# Patient Record
Sex: FEMALE | Race: WHITE | NOT HISPANIC OR LATINO | URBAN - METROPOLITAN AREA
[De-identification: names, ages, dates, MRNs, and addresses within clinical notes are randomized per-mention and may not be internally consistent; named-entity substitution may affect disease eponyms.]

---

## 2017-11-27 ENCOUNTER — TRANSCRIBE ORDERS (OUTPATIENT)
Dept: PHYSICAL THERAPY | Facility: HOSPITAL | Age: 62
End: 2017-11-27

## 2017-11-27 DIAGNOSIS — S81.802D WOUND OF LEFT LOWER EXTREMITY, SUBSEQUENT ENCOUNTER: Primary | ICD-10-CM

## 2017-11-27 DIAGNOSIS — S41.031D: ICD-10-CM

## 2017-11-30 ENCOUNTER — HOSPITAL ENCOUNTER (OUTPATIENT)
Dept: PHYSICAL THERAPY | Facility: HOSPITAL | Age: 62
Setting detail: THERAPIES SERIES
Discharge: HOME | End: 2017-11-30

## 2017-11-30 DIAGNOSIS — S41.001D OPEN WOUND OF RIGHT SHOULDER, SUBSEQUENT ENCOUNTER: Primary | ICD-10-CM

## 2017-11-30 PROCEDURE — 97162 PT EVAL MOD COMPLEX 30 MIN: CPT

## 2017-11-30 PROCEDURE — 97597 DBRDMT OPN WND 1ST 20 CM/<: CPT

## 2017-12-04 ENCOUNTER — HOSPITAL ENCOUNTER (OUTPATIENT)
Dept: PHYSICAL THERAPY | Facility: HOSPITAL | Age: 62
Setting detail: THERAPIES SERIES
Discharge: HOME | End: 2017-12-04

## 2017-12-04 DIAGNOSIS — S41.001D OPEN WOUND OF RIGHT SHOULDER, SUBSEQUENT ENCOUNTER: Primary | ICD-10-CM

## 2017-12-04 PROCEDURE — 97597 DBRDMT OPN WND 1ST 20 CM/<: CPT

## 2017-12-04 NOTE — THERAPY WOUND CARE TREATMENT
Outpatient Rehabilitation - Wound/Debridement Treatment Note  [facility]     Patient Name: Nunu Cruz  : 1955  MRN: [id number]  Today's Date: 2017                Admit Date: 2017    Visit Dx:    ICD-10-CM ICD-9-CM   1. Open wound of right shoulder, subsequent encounter S41.001D V58.89     880.00       There is no problem list on file for this patient.       Past Medical History:   Diagnosis Date   • Cancer     skin        Past Surgical History:   Procedure Laterality Date   • SKIN BIOPSY     • SKIN CANCER EXCISION           EVALUATION        PT Ortho       17 0800    Subjective Comments    Subjective Comments Pt reports she and her dtr can both already see progress with the current POC, more than they had with their previous POC. Pt is very pleased and does not think she needs a skin graft.  -MC    Subjective Pain    Able to rate subjective pain? yes  -MC    Pre-Treatment Pain Level 0  -MC    Post-Treatment Pain Level 0  -MC    Transfers    Transfers, Sit-Stand [place] independent  -MC    Transfers, Stand-Sit [place] independent  -MC    Transfer, Comment seated on EOB for tx  -MC    Gait Assessment/Treatment    Gait, [place] Level independent  -MC      User Key  (r) = Recorded By, (t) = Taken By, (c) = Cosigned By    Initials Name Provider Type     Brianna Rivas, PT Physical Therapist                    [facility] Wound       17 08          Wound 17 0845 Right shoulder other (see comments)    Wound - Properties Group Date first assessed: 17  - Time first assessed: 0845  - Side: Right  - Location: shoulder  - Type: other (see comments)  - Additional Comments: open wound s/p skin cancer excision  -    Wound WDL WDL  -MC      Dressing Appearance moist drainage;intact;dried drainage  -      Base debrided;clean;moist;pink;reddened;epithelialization   some evidence of epithelialization at the wound edges  -      Periwound Area  "intact;pink;dry  -MC      Edges open  -      Drainage Characteristics/Odor serosanguineous;no odor  -MC      Drainage Amount small  -MC      Wound Cleaning cleansed with;other (see comments)   phase one  -      Wound Interventions debrided  -      Dressing Dressing applied;silver impregnated dressing;low-adherent;foam   mepilex Ag foam, 6\" optifoam gentle  -MC      Periwound Care cleansed with pH balanced cleanser;dry periwound area maintained  -        User Key  (r) = Recorded By, (t) = Taken By, (c) = Cosigned By    Initials Name Provider Type    JACKIE Rivas, PT Physical Therapist            WOUND DEBRIDEMENT  Debridement Site 1  Location- Site 1: R shoulder  Selective Debridement- Site 1: Wound Surface <20cmsq  Instruments- Site 1: tweezers  Excised Tissue Description- Site 1: minimum, other (comment) (hypertrophic periwound crust)  Bleeding- Site 1: seeping, held pressure, 1 minute                   Therapy Education       12/04/17 0800          Therapy Education    Education Details Continue with current POC. Will drop down to once/week after Thursday appt if all is progressing well.  -MC      Given Symptoms/condition management;Bandaging/dressing change  -MC      Program Reinforced  -MC      How Provided Verbal;Demonstration  -MC      Provided to Patient  -MC      Level of Understanding Verbalized  -        User Key  (r) = Recorded By, (t) = Taken By, (c) = Cosigned By    Initials Name Provider Type    JACKIE Rivas PT Physical Therapist          Recommendation and Plan        PT Assessment/Plan       12/04/17 0800       PT Assessment    Functional Limitations Limitations in functional capacity and performance;Performance in self-care ADL;Other (comment)   wound mgmt  -     Impairments Integumentary integrity  -MC     Assessment Comments Pt demonstrates epithelialization at wound edges and minimal hypertrophic crust to be debrided today. The remainder of the wound bed is clean and " moist/red. Pt appears to be progressing well with the current POC. Pt follows up Thursday, will likely be appropriate to drop down to once/week if all is progressing well at this point.  -     Rehab Potential Good  -     Patient/caregiver participated in establishment of treatment plan and goals Yes  -     Patient would benefit from skilled therapy intervention Yes  -     PT Plan    PT Frequency 1x/week;2x/week  -     Physical Therapy Interventions (Optional Details) patient/family education;wound care  -     PT Plan Comments debridement prn, dressings management. No MIST.  -       User Key  (r) = Recorded By, (t) = Taken By, (c) = Cosigned By    Initials Name Provider Type     Brianna Rivas, PT Physical Therapist          Goals        PT OP Goals       12/04/17 0800       Time Calculation    PT Goal Re-Cert Due Date 02/28/18  -       User Key  (r) = Recorded By, (t) = Taken By, (c) = Cosigned By    Initials Name Provider Type     Brianna Rivas, PT Physical Therapist          PT Goal Re-Cert Due Date: 02/28/18            Time Calculation: Start Time: 0800    Therapy Charges for Today     Code Description Service Date Service Provider Modifiers Qty    29177967663 HC DAMIR DEBRIDE OPEN WOUND UP TO 20CM 12/4/2017 Brianna Rivas, PT GP 1                Brianna Rivas PT  12/4/2017

## 2017-12-07 ENCOUNTER — HOSPITAL ENCOUNTER (OUTPATIENT)
Dept: PHYSICAL THERAPY | Facility: HOSPITAL | Age: 62
Setting detail: THERAPIES SERIES
Discharge: HOME | End: 2017-12-07

## 2017-12-07 DIAGNOSIS — S41.001D OPEN WOUND OF RIGHT SHOULDER, SUBSEQUENT ENCOUNTER: Primary | ICD-10-CM

## 2017-12-07 PROCEDURE — 97597 DBRDMT OPN WND 1ST 20 CM/<: CPT

## 2017-12-07 NOTE — THERAPY WOUND CARE TREATMENT
Outpatient Rehabilitation - Wound/Debridement Treatment Note  [facility]     Patient Name: Nunu Cruz  : 1955  MRN: [id number]  Today's Date: 2017                Admit Date: 2017    Visit Dx:    ICD-10-CM ICD-9-CM   1. Open wound of right shoulder, subsequent encounter S41.001D V58.89     880.00       There is no problem list on file for this patient.       Past Medical History:   Diagnosis Date   • Cancer     skin        Past Surgical History:   Procedure Laterality Date   • SKIN BIOPSY     • SKIN CANCER EXCISION           EVALUATION        PT Ortho       17 09    Subjective Comments    Subjective Comments No complaints. Dtr did not change the dressing last night since she was coming here this morning.   -MC    Subjective Pain    Able to rate subjective pain? yes  -MC    Pre-Treatment Pain Level 0  -MC    Post-Treatment Pain Level 0  -MC    Transfers    Transfers, Sit-Stand [place] independent  -MC    Transfers, Stand-Sit [place] independent  -MC    Transfer, Comment seated on EOB for tx  -MC    Gait Assessment/Treatment    Gait, [place] Level independent  -MC      User Key  (r) = Recorded By, (t) = Taken By, (c) = Cosigned By    Initials Name Provider Type     Brianna Rivas, PT Physical Therapist                    [facility] Wound       17          Wound 17 0845 Right shoulder other (see comments)    Wound - Properties Group Date first assessed: 17  - Time first assessed: 845  - Side: Right  -MC Location: shoulder  - Type: other (see comments)  - Additional Comments: open wound s/p skin cancer excision  -MC    Wound WDL WDL  -MC      Dressing Appearance moist drainage;intact;dried drainage  -      Base debrided;clean;moist;pink;reddened;epithelialization   epithelialization at wound edges  -      Periwound Area intact;pink;dry  -MC      Edges open  -MC      Length (cm) 4.7  -MC      Width (cm) 7.1  -MC      Depth (cm) 0.1  -MC    "   Drainage Characteristics/Odor serosanguineous;no odor  -      Drainage Amount small  -MC      Picture taken yes  -MC      Wound Cleaning cleansed with;other (see comments)   phase one  -      Wound Interventions debrided  -      Dressing Dressing applied;silver impregnated dressing;low-adherent;foam   mepilex Ag foam, 6\" optifoam gentle border  -      Periwound Care cleansed with pH balanced cleanser;dry periwound area maintained  -        User Key  (r) = Recorded By, (t) = Taken By, (c) = Cosigned By    Initials Name Provider Type    JACKIE Rivas, PT Physical Therapist            WOUND DEBRIDEMENT  Debridement Site 1  Location- Site 1: R shoulder  Selective Debridement- Site 1: Wound Surface <20cmsq  Instruments- Site 1: tweezers  Excised Tissue Description- Site 1: minimum, other (comment) (periwound crust over new, intact skin)  Bleeding- Site 1: scant, held pressure, 1 minute                   Therapy Education       12/07/17 0930          Therapy Education    Education Details Continue with current POC - treatment once/week with dtr changing dressing every 2-3 days between appointments.  -MC      Given Symptoms/condition management;Bandaging/dressing change  -MC      Program Reinforced  -MC      How Provided Verbal;Demonstration  -MC      Provided to Patient  -MC      Level of Understanding Verbalized  -        User Key  (r) = Recorded By, (t) = Taken By, (c) = Cosigned By    Initials Name Provider Type    JACKIE Rivas, PT Physical Therapist          Recommendation and Plan        PT Assessment/Plan       12/07/17 0930       PT Assessment    Functional Limitations Limitations in functional capacity and performance;Performance in self-care ADL;Other (comment)   wound mgmt  -MC     Impairments Integumentary integrity  -MC     Assessment Comments Pt with decrease in wound dimensions since beginning treatment, down to 4.7 cm x 7.1 cm, with clear evidence of epithelialization at the " wound edges. Pt continues to develop crust over the newly intact epithelialization at the wound edges, which requires debridement. As pt is progressing well with the current POC, pt will be appropriate to decrease frequency to once/week with family maintaining dressing changes between treatments.  -     Rehab Potential Good  -     Patient/caregiver participated in establishment of treatment plan and goals Yes  -     Patient would benefit from skilled therapy intervention Yes  -     PT Plan    PT Frequency 1x/week  -     Physical Therapy Interventions (Optional Details) patient/family education;wound care  -     PT Plan Comments debridement prn, dressings management, no MIST  -       User Key  (r) = Recorded By, (t) = Taken By, (c) = Cosigned By    Initials Name Provider Type     Brianna Rivas, PT Physical Therapist          Goals        PT OP Goals       12/07/17 0930       Time Calculation    PT Goal Re-Cert Due Date 02/28/18  -       User Key  (r) = Recorded By, (t) = Taken By, (c) = Cosigned By    Initials Name Provider Type     Brianna Rivas PT Physical Therapist          PT Goal Re-Cert Due Date: 02/28/18            Time Calculation: Start Time: 0930    Therapy Charges for Today     Code Description Service Date Service Provider Modifiers Qty    41179296994 HC DAMIR DEBRIDE OPEN WOUND UP TO 20CM 12/7/2017 Brianna Rivas, PT GP 1                Brianna Rivas, PT  12/7/2017

## 2017-12-12 ENCOUNTER — APPOINTMENT (OUTPATIENT)
Dept: PHYSICAL THERAPY | Facility: HOSPITAL | Age: 62
End: 2017-12-12

## 2017-12-13 ENCOUNTER — HOSPITAL ENCOUNTER (OUTPATIENT)
Dept: PHYSICAL THERAPY | Facility: HOSPITAL | Age: 62
Setting detail: THERAPIES SERIES
Discharge: HOME | End: 2017-12-13

## 2017-12-13 DIAGNOSIS — S41.001D OPEN WOUND OF RIGHT SHOULDER, SUBSEQUENT ENCOUNTER: Primary | ICD-10-CM

## 2017-12-13 PROCEDURE — 97597 DBRDMT OPN WND 1ST 20 CM/<: CPT

## 2017-12-13 NOTE — THERAPY WOUND CARE TREATMENT
Outpatient Rehabilitation - Wound/Debridement Treatment Note  [facility]     Patient Name: Nunu Cruz  : 1955  MRN: [id number]  Today's Date: 2017                Admit Date: 2017    Visit Dx:    ICD-10-CM ICD-9-CM   1. Open wound of right shoulder, subsequent encounter S41.001D V58.89     880.00       There is no problem list on file for this patient.       Past Medical History:   Diagnosis Date   • Cancer     skin        Past Surgical History:   Procedure Laterality Date   • SKIN BIOPSY     • SKIN CANCER EXCISION           EVALUATION        PT Ortho       17 0800    Subjective Comments    Subjective Comments No complaints or changes. Pt is very pleased with how much progress she has made.  -MC    Subjective Pain    Able to rate subjective pain? yes  -MC    Pre-Treatment Pain Level 0  -MC    Post-Treatment Pain Level 0  -MC    Transfers    Transfers, Sit-Stand [place] independent  -MC    Transfers, Stand-Sit [place] independent  -    Transfer, Comment seated EOB for tx  -MC    Gait Assessment/Treatment    Gait, [place] Level independent  -      User Key  (r) = Recorded By, (t) = Taken By, (c) = Cosigned By    Initials Name Provider Type     Brianna Rivas, PT Physical Therapist                    [facility] Wound       17 08          Wound 17 0845 Right shoulder other (see comments)    Wound - Properties Group Date first assessed: 17  - Time first assessed: 845  - Side: Right  - Location: shoulder  - Type: other (see comments)  - Additional Comments: open wound s/p skin cancer excision  -    Wound WDL WDL  -MC      Dressing Appearance moist drainage;intact;dried drainage  -      Base debrided;clean;moist;pink;reddened;epithelialization   epithelialization at wound edges  -      Periwound Area intact;pink;dry  -MC      Edges open  -MC      Length (cm) 4  -MC      Width (cm) 5.8  -MC      Depth (cm) 0.1  -MC      Drainage  "Characteristics/Odor serosanguineous;no odor  -      Drainage Amount small  -      Picture taken yes  -MC      Wound Cleaning cleansed with;other (see comments)   phase one  -      Wound Interventions debrided  -      Dressing Dressing applied;silver impregnated dressing;low-adherent;foam   mepilex Ag, 6\" optifoam gentle  -      Periwound Care cleansed with pH balanced cleanser;dry periwound area maintained  -        User Key  (r) = Recorded By, (t) = Taken By, (c) = Cosigned By    Initials Name Provider Type    JACKIE Rivas, PT Physical Therapist            WOUND DEBRIDEMENT  Debridement Site 1  Location- Site 1: R shoulder  Selective Debridement- Site 1: Wound Surface <20cmsq  Instruments- Site 1: tweezers  Excised Tissue Description- Site 1: minimum, slough, moderate, other (comment) (min biofilm from center of wound, mod periwound crusting)  Bleeding- Site 1: scant, held pressure, 1 minute                   Therapy Education       12/13/17 0800          Therapy Education    Given Symptoms/condition management;Bandaging/dressing change  -      Program Reinforced  -      How Provided Verbal;Demonstration  -MC      Provided to Patient  -      Level of Understanding Verbalized  -        User Key  (r) = Recorded By, (t) = Taken By, (c) = Cosigned By    Initials Name Provider Type    JACKIE Rivas PT Physical Therapist          Recommendation and Plan        PT Assessment/Plan       12/13/17 0800       PT Assessment    Functional Limitations Limitations in functional capacity and performance;Performance in self-care ADL;Other (comment)   wound mgmt  -     Impairments Integumentary integrity  -     Assessment Comments Pt continues to demonstrate great progress between appointments. Pt with some hypertrophic periwound crusting and some biofilm development that required debridement today. However, pt with decreased wound dimensions down to 4 x 5.8 cm. Pt will continue to benefit " from the current POC to continue progress.  -     Rehab Potential Good  -     Patient/caregiver participated in establishment of treatment plan and goals Yes  -     Patient would benefit from skilled therapy intervention Yes  -     PT Plan    PT Frequency 1x/week  -     Physical Therapy Interventions (Optional Details) patient/family education;wound care  -     PT Plan Comments debridement prn, dressings management, no MIST  -       User Key  (r) = Recorded By, (t) = Taken By, (c) = Cosigned By    Initials Name Provider Type    JACKIE Rivas, PT Physical Therapist          Goals        PT OP Goals       12/13/17 0800       Time Calculation    PT Goal Re-Cert Due Date 02/28/18  -       User Key  (r) = Recorded By, (t) = Taken By, (c) = Cosigned By    Initials Name Provider Type    JACKIE Rivas PT Physical Therapist          PT Goal Re-Cert Due Date: 02/28/18            Time Calculation: Start Time: 0800    Therapy Charges for Today     Code Description Service Date Service Provider Modifiers Qty    [id number]  DAMIR DEBRIDE OPEN WOUND UP TO 20CM 12/13/2017 Brianna Rivas, PT GP 1                Brianna Rivas, PT  12/13/2017

## 2017-12-15 ENCOUNTER — APPOINTMENT (OUTPATIENT)
Dept: PHYSICAL THERAPY | Facility: HOSPITAL | Age: 62
End: 2017-12-15

## 2017-12-19 ENCOUNTER — HOSPITAL ENCOUNTER (OUTPATIENT)
Dept: PHYSICAL THERAPY | Facility: HOSPITAL | Age: 62
Setting detail: THERAPIES SERIES
Discharge: HOME | End: 2017-12-19

## 2017-12-19 DIAGNOSIS — S41.001D OPEN WOUND OF RIGHT SHOULDER, SUBSEQUENT ENCOUNTER: Primary | ICD-10-CM

## 2017-12-19 PROCEDURE — 97597 DBRDMT OPN WND 1ST 20 CM/<: CPT

## 2017-12-19 NOTE — THERAPY WOUND CARE TREATMENT
Outpatient Rehabilitation - Wound/Debridement Treatment Note  [facility]     Patient Name: Nunu Cruz  : 1955  MRN: [id number]  Today's Date: 2017                Admit Date: 2017    Visit Dx:    ICD-10-CM ICD-9-CM   1. Open wound of right shoulder, subsequent encounter S41.001D V58.89     880.00       There is no problem list on file for this patient.       Past Medical History:   Diagnosis Date   • Cancer     skin        Past Surgical History:   Procedure Laterality Date   • SKIN BIOPSY     • SKIN CANCER EXCISION           EVALUATION        PT Ortho       17 0800    Subjective Comments    Subjective Comments Pt without complaints, states it's starting to itch around the edges.  -JM    Subjective Pain    Able to rate subjective pain? yes  -JM    Pre-Treatment Pain Level 0  -JM    Post-Treatment Pain Level 0  -JM    Transfers    Transfer, Comment seated for tx  -JM    Gait Assessment/Treatment    Gait, [place] Level independent  -      User Key  (r) = Recorded By, (t) = Taken By, (c) = Cosigned By    Initials Name Provider Type    LAUREL Snell, PT Physical Therapist                    [facility] Wound       17 08          Wound 17 0845 Right shoulder other (see comments)    Wound - Properties Group Date first assessed: 17  - Time first assessed: 845  - Side: Right  - Location: shoulder  - Type: other (see comments)  - Additional Comments: open wound s/p skin cancer excision  -MC    Wound WDL WDL  -JM      Dressing Appearance moist drainage;intact;dried drainage  -JM      Base debrided;clean;moist;pink;reddened;epithelialization   epithelialization at wound edges  -      Periwound Area intact;pink;dry  -JM      Edges open   hypertrophic crusting  -JM      Length (cm) 2.9  -JM      Width (cm) 4.9  -JM      Depth (cm) 0.1  -JM      Drainage Characteristics/Odor serosanguineous;no odor  -JM      Drainage Amount small  -JM      Picture taken yes   "-      Wound Cleaning cleansed with;other (see comments)   phase one  -      Wound Interventions debrided;honey  -      Dressing Dressing applied;silver impregnated dressing;foam;low-adherent   mepilex ag, 6\" optifoam gentle  -      Periwound Care cleansed with pH balanced cleanser;dry periwound area maintained  -        User Key  (r) = Recorded By, (t) = Taken By, (c) = Cosigned By    Initials Name Provider Type    JACKIE Rivas, PT Physical Therapist    LAUREL Snell, PT Physical Therapist            WOUND DEBRIDEMENT  Debridement Site 1  Location- Site 1: R shoulder  Selective Debridement- Site 1: Wound Surface <20cmsq  Instruments- Site 1: tweezers, scissors  Excised Tissue Description- Site 1: minimum, other (comment) (biofilm, hypertrophic crusting around edges)  Bleeding- Site 1: scant                   Therapy Education       12/19/17 0800          Therapy Education    Education Details add small amount of therahoney to ag foam when changing dressing  -      Given Symptoms/condition management;Bandaging/dressing change  -      Program Reinforced  -      How Provided Verbal;Demonstration  -      Provided to Patient  -      Level of Understanding Verbalized  -        User Key  (r) = Recorded By, (t) = Taken By, (c) = Cosigned By    Initials Name Provider Type    LAUREL Snell, PT Physical Therapist          Recommendation and Plan        PT Assessment/Plan       12/19/17 0800       PT Assessment    Functional Limitations Limitations in functional capacity and performance;Performance in self-care ADL;Other (comment)   wound mgmt  -     Impairments Integumentary integrity  -     Assessment Comments R shoulder wound with continued decrease in area, now with dry edges and hypertrophic crust, so PT added therahoney to wound to provide moisture and antimicrobial environment.  Anticipate continued good progress.  -     Rehab Potential Good  -     Patient/caregiver " participated in establishment of treatment plan and goals Yes  -     Patient would benefit from skilled therapy intervention Yes  -     PT Plan    PT Frequency 1x/week  -     Physical Therapy Interventions (Optional Details) patient/family education;wound care  -     PT Plan Comments debridement, dressing management  -       User Key  (r) = Recorded By, (t) = Taken By, (c) = Cosigned By    Initials Name Provider Type    LAUREL Snell, PT Physical Therapist          Goals        PT OP Goals       12/19/17 0800       Time Calculation    PT Goal Re-Cert Due Date 02/28/18  -       User Key  (r) = Recorded By, (t) = Taken By, (c) = Cosigned By    Initials Name Provider Type    LAUREL Snell, PT Physical Therapist          PT Goal Re-Cert Due Date: 02/28/18            Time Calculation: Start Time: 0800    Therapy Charges for Today     Code Description Service Date Service Provider Modifiers Qty    [id number] HC DAMIR DEBRIDE OPEN WOUND UP TO 20CM 12/19/2017 Sydni Snell, PT GP 1                Sydni Snell, PT  12/19/2017

## 2017-12-27 ENCOUNTER — APPOINTMENT (OUTPATIENT)
Dept: PHYSICAL THERAPY | Facility: HOSPITAL | Age: 62
End: 2017-12-27

## 2017-12-30 ENCOUNTER — HOSPITAL ENCOUNTER (OUTPATIENT)
Dept: PHYSICAL THERAPY | Facility: HOSPITAL | Age: 62
Setting detail: THERAPIES SERIES
Discharge: HOME | End: 2017-12-30

## 2017-12-30 DIAGNOSIS — S41.001D OPEN WOUND OF RIGHT SHOULDER, SUBSEQUENT ENCOUNTER: Primary | ICD-10-CM

## 2017-12-30 PROCEDURE — 97597 DBRDMT OPN WND 1ST 20 CM/<: CPT | Performed by: PHYSICAL THERAPIST

## 2017-12-30 NOTE — THERAPY DISCHARGE NOTE
Outpatient Rehabilitation - Wound/Debridement Progress Note &  Discharge Summary  [facility]     Patient Name: Nunu Cruz  : 1955  MRN: [id number]  Today's Date: 2017                Admit Date: 2017    Visit Dx:    ICD-10-CM ICD-9-CM   1. Open wound of right shoulder, subsequent encounter S41.001D V58.89     880.00        Past Medical History:   Diagnosis Date   • Cancer     skin        Past Surgical History:   Procedure Laterality Date   • SKIN BIOPSY     • SKIN CANCER EXCISION                 PT Ortho       17 08    Subjective Comments    Subjective Comments Reports daughter has been changing it every 3 days. This needs to be the last visit due to insurance fees.   -MW    Subjective Pain    Able to rate subjective pain? yes  -MW    Pre-Treatment Pain Level 1  -MW    Post-Treatment Pain Level 1  -MW    Subjective Pain Comment tender   -MW    Transfers    Transfers, Sit-Stand [place] independent  -MW    Transfers, Stand-Sit [place] independent  -MW    Transfer, Comment seated for tx  -MW    Gait Assessment/Treatment    Gait, [place] Level independent  -MW      User Key  (r) = Recorded By, (t) = Taken By, (c) = Cosigned By    Initials Name Provider Type    MW Niki Estrada PT Physical Therapist                    [facility] Wound       17 08          Wound 17 08 Right shoulder other (see comments)    Wound - Properties Group Date first assessed: 17  - Time first assessed: 845  - Side: Right  - Location: shoulder  - Type: other (see comments)  - Additional Comments: open wound s/p skin cancer excision  -    Wound WDL ex   min drainage   -MW      Dressing Appearance moist drainage;intact;dried drainage  -MW      Base moist;pink;yellow;slough;epithelialization   white/ yellow slough with pink granulation islands   -MW      Periwound Area intact;pink;dry   crust and residual honey   -MW      Edges open  -MW      Length (cm) 1.2  -MW       "Width (cm) 3.1  -MW      Depth (cm) 0.1  -MW      Drainage Characteristics/Odor serosanguineous;no odor  -MW      Drainage Amount scant  -MW      Picture taken yes  -MW      Wound Cleaning cleansed with;other (see comments)   Phase one   -MW      Wound Interventions debrided;honey  -MW      Dressing Dressing applied;silver impregnated dressing;low-adherent;foam   Therahoney, Mepilex AG, 4\" optifoam   -MW      Periwound Care cleansed with pH balanced cleanser;dry periwound area maintained  -MW        User Key  (r) = Recorded By, (t) = Taken By, (c) = Cosigned By    Initials Name Provider Type    ZAFAR Estrada, PT Physical Therapist    JACKIE Rivas, PT Physical Therapist            WOUND DEBRIDEMENT  Debridement Site 1  Location- Site 1: R shoulder  Selective Debridement- Site 1: Wound Surface <20cmsq  Instruments- Site 1: tweezers, scissors  Excised Tissue Description- Site 1: minimum, slough, moderate, other (comment) (hypertrophic crust from periwound skin )  Bleeding- Site 1: seeping, scant             Therapy Education  Education Details: cont Therahoney, AG foam dressing changes q 3-5 days. Call if issues, otherwise last visit per pt request   Given: Symptoms/condition management, Bandaging/dressing change  Program: Reinforced  How Provided: Verbal, Demonstration  Provided to: Patient  Level of Understanding: Verbalized          Therapy Education       12/30/17 0800          Therapy Education    Education Details cont Therahoney, AG foam dressing changes q 3-5 days. Call if issues, otherwise last visit per pt request   -MW      Given Symptoms/condition management;Bandaging/dressing change  -MW      Program Reinforced  -MW      How Provided Verbal;Demonstration  -MW      Provided to Patient  -MW      Level of Understanding Verbalized  -MW        User Key  (r) = Recorded By, (t) = Taken By, (c) = Cosigned By    Initials Name Provider Type    ZAFAR Estrada, PT Physical Therapist    "       Recommendation and Plan        PT Assessment/Plan       12/30/17 0800       PT Assessment    Functional Limitations Limitations in functional capacity and performance;Performance in self-care ADL;Other (comment)   wound mgmt  -     Impairments Integumentary integrity  -     Assessment Comments Rt shoulder wound continues to decrease in dimensions. Required additional debridement this visit for central slough/ biofilm and periwound hypertrophic skin crust mixed with residual Therahoney. Goals met except for one partially met. Pt requesting this to be last visit due to insurance concerns. Expect pt and family to be able to manage wound to closure. Has MD follow up prn.   -     Rehab Potential Good  -     Patient/caregiver participated in establishment of treatment plan and goals Yes  -MW     Patient would benefit from skilled therapy intervention Yes  -MW     PT Plan    PT Frequency Other (comment)   Pt to call if additional PT care needed.   -     Physical Therapy Interventions (Optional Details) wound care;patient/family education  -     PT Plan Comments hold chart 30 days; pt to call if assistance needed. Expect this D/C visit   -       User Key  (r) = Recorded By, (t) = Taken By, (c) = Cosigned By    Initials Name Provider Type     Niki Estrada, PT Physical Therapist          Goals        PT OP Goals       12/30/17 0800       PT Short Term Goals    STG 1 Pt will verbalize s/sx of infection.  -     STG 1 Progress Met  -     STG 2 Pt will demonstrate 25% reduction in wound dimensions to indicate progress.  -     STG 2 Progress Met  -     Long Term Goals    LTG 1 Pt will demonstrate independence with clean home dressing changes.  -     LTG 1 Progress Met  -     LTG 1 Progress Comments Daughter completing   -     LTG 2 Pt will demonstrate scant wound exudate to indicate progress.  -     LTG 2 Progress Met  -     LTG 3 Pt will demonstrate 75% reduction in wound dimensions to  indicate healing progress.  -MW     LTG 3 Progress Partially Met;Progressing  -MW       User Key  (r) = Recorded By, (t) = Taken By, (c) = Cosigned By    Initials Name Provider Type    ZAFAR Estrada, PT Physical Therapist          Time Calculation: Start Time: 0800    Therapy Charges for Today     Code Description Service Date Service Provider Modifiers Qty    19485863671 HC DAMIR DEBRIDE OPEN WOUND UP TO 20CM 12/30/2017 Niki Estrada, PT GP 1                    iNki Estrada, FABIANO  12/30/2017

## 2021-04-02 ENCOUNTER — IMMUNIZATION (OUTPATIENT)
Dept: VACCINE CLINIC | Facility: HOSPITAL | Age: 66
End: 2021-04-02

## 2021-04-02 PROCEDURE — 91300 HC SARSCOV02 VAC 30MCG/0.3ML IM: CPT | Performed by: INTERNAL MEDICINE

## 2021-04-02 PROCEDURE — 0001A: CPT | Performed by: INTERNAL MEDICINE

## 2021-04-27 ENCOUNTER — IMMUNIZATION (OUTPATIENT)
Dept: VACCINE CLINIC | Facility: HOSPITAL | Age: 66
End: 2021-04-27

## 2021-04-27 PROCEDURE — 91300 HC SARSCOV02 VAC 30MCG/0.3ML IM: CPT | Performed by: INTERNAL MEDICINE

## 2021-04-27 PROCEDURE — 0002A: CPT | Performed by: INTERNAL MEDICINE

## 2022-03-02 ENCOUNTER — APPOINTMENT (OUTPATIENT)
Dept: GENERAL RADIOLOGY | Facility: HOSPITAL | Age: 67
End: 2022-03-02

## 2022-03-02 ENCOUNTER — HOSPITAL ENCOUNTER (EMERGENCY)
Facility: HOSPITAL | Age: 67
Discharge: HOME | End: 2022-03-02
Attending: EMERGENCY MEDICINE | Admitting: EMERGENCY MEDICINE

## 2022-03-02 ENCOUNTER — APPOINTMENT (OUTPATIENT)
Dept: CT IMAGING | Facility: HOSPITAL | Age: 67
End: 2022-03-02

## 2022-03-02 VITALS
DIASTOLIC BLOOD PRESSURE: 83 MMHG | HEART RATE: 55 BPM | BODY MASS INDEX: 26.19 KG/M2 | HEIGHT: 63 IN | WEIGHT: 147.8 LBS | TEMPERATURE: 98.7 F | RESPIRATION RATE: 18 BRPM | SYSTOLIC BLOOD PRESSURE: 164 MMHG | OXYGEN SATURATION: 98 %

## 2022-03-02 DIAGNOSIS — M54.41 ACUTE RIGHT-SIDED LOW BACK PAIN WITH RIGHT-SIDED SCIATICA: Primary | ICD-10-CM

## 2022-03-02 LAB
ALBUMIN SERPL-MCNC: 4.6 G/DL (ref 3.5–5.2)
ALBUMIN/GLOB SERPL: 1.6 G/DL
ALP SERPL-CCNC: 77 U/L (ref 39–117)
ALT SERPL W P-5'-P-CCNC: 13 U/L (ref 1–33)
ANION GAP SERPL CALCULATED.3IONS-SCNC: 12.4 MMOL/L (ref 5–15)
AST SERPL-CCNC: 13 U/L (ref 1–32)
BASOPHILS # BLD AUTO: 0.02 10*3/MM3 (ref 0–0.2)
BASOPHILS NFR BLD AUTO: 0.1 % (ref 0–1.5)
BILIRUB SERPL-MCNC: 0.2 MG/DL (ref 0–1.2)
BILIRUB UR QL STRIP: NEGATIVE
BUN SERPL-MCNC: 13 MG/DL (ref 8–23)
BUN/CREAT SERPL: 14.9 (ref 7–25)
CALCIUM SPEC-SCNC: 9.9 MG/DL (ref 8.6–10.5)
CHLORIDE SERPL-SCNC: 102 MMOL/L (ref 98–107)
CLARITY UR: CLEAR
CO2 SERPL-SCNC: 24.6 MMOL/L (ref 22–29)
COLOR UR: YELLOW
CREAT SERPL-MCNC: 0.87 MG/DL (ref 0.57–1)
DEPRECATED RDW RBC AUTO: 43.7 FL (ref 37–54)
EGFRCR SERPLBLD CKD-EPI 2021: 73.6 ML/MIN/1.73
EOSINOPHIL # BLD AUTO: 0.04 10*3/MM3 (ref 0–0.4)
EOSINOPHIL NFR BLD AUTO: 0.2 % (ref 0.3–6.2)
ERYTHROCYTE [DISTWIDTH] IN BLOOD BY AUTOMATED COUNT: 13.1 % (ref 12.3–15.4)
FLUAV RNA RESP QL NAA+PROBE: NOT DETECTED
FLUBV RNA RESP QL NAA+PROBE: NOT DETECTED
GLOBULIN UR ELPH-MCNC: 2.8 GM/DL
GLUCOSE SERPL-MCNC: 99 MG/DL (ref 65–99)
GLUCOSE UR STRIP-MCNC: NEGATIVE MG/DL
HCT VFR BLD AUTO: 40.2 % (ref 34–46.6)
HGB BLD-MCNC: 13.8 G/DL (ref 12–15.9)
HGB UR QL STRIP.AUTO: NEGATIVE
IMM GRANULOCYTES # BLD AUTO: 0.1 10*3/MM3 (ref 0–0.05)
IMM GRANULOCYTES NFR BLD AUTO: 0.6 % (ref 0–0.5)
KETONES UR QL STRIP: NEGATIVE
LEUKOCYTE ESTERASE UR QL STRIP.AUTO: NEGATIVE
LYMPHOCYTES # BLD AUTO: 3.56 10*3/MM3 (ref 0.7–3.1)
LYMPHOCYTES NFR BLD AUTO: 19.9 % (ref 19.6–45.3)
MCH RBC QN AUTO: 31.4 PG (ref 26.6–33)
MCHC RBC AUTO-ENTMCNC: 34.3 G/DL (ref 31.5–35.7)
MCV RBC AUTO: 91.6 FL (ref 79–97)
MONOCYTES # BLD AUTO: 0.76 10*3/MM3 (ref 0.1–0.9)
MONOCYTES NFR BLD AUTO: 4.3 % (ref 5–12)
NEUTROPHILS NFR BLD AUTO: 13.38 10*3/MM3 (ref 1.7–7)
NEUTROPHILS NFR BLD AUTO: 74.9 % (ref 42.7–76)
NITRITE UR QL STRIP: NEGATIVE
NRBC BLD AUTO-RTO: 0 /100 WBC (ref 0–0.2)
PH UR STRIP.AUTO: <=5 [PH] (ref 5–8)
PLATELET # BLD AUTO: 394 10*3/MM3 (ref 140–450)
PMV BLD AUTO: 9.8 FL (ref 6–12)
POTASSIUM SERPL-SCNC: 4.2 MMOL/L (ref 3.5–5.2)
PROT SERPL-MCNC: 7.4 G/DL (ref 6–8.5)
PROT UR QL STRIP: NEGATIVE
RBC # BLD AUTO: 4.39 10*6/MM3 (ref 3.77–5.28)
SARS-COV-2 RNA RESP QL NAA+PROBE: NOT DETECTED
SODIUM SERPL-SCNC: 139 MMOL/L (ref 136–145)
SP GR UR STRIP: >1.03 (ref 1–1.03)
UROBILINOGEN UR QL STRIP: ABNORMAL
WBC NRBC COR # BLD: 17.86 10*3/MM3 (ref 3.4–10.8)

## 2022-03-02 PROCEDURE — 85025 COMPLETE CBC W/AUTO DIFF WBC: CPT | Performed by: NURSE PRACTITIONER

## 2022-03-02 PROCEDURE — 80053 COMPREHEN METABOLIC PANEL: CPT | Performed by: NURSE PRACTITIONER

## 2022-03-02 PROCEDURE — 25010000002 IOPAMIDOL 61 % SOLUTION: Performed by: EMERGENCY MEDICINE

## 2022-03-02 PROCEDURE — 25010000002 METHYLPREDNISOLONE PER 125 MG: Performed by: NURSE PRACTITIONER

## 2022-03-02 PROCEDURE — 96375 TX/PRO/DX INJ NEW DRUG ADDON: CPT

## 2022-03-02 PROCEDURE — 96374 THER/PROPH/DIAG INJ IV PUSH: CPT

## 2022-03-02 PROCEDURE — 99283 EMERGENCY DEPT VISIT LOW MDM: CPT

## 2022-03-02 PROCEDURE — 87636 SARSCOV2 & INF A&B AMP PRB: CPT | Performed by: NURSE PRACTITIONER

## 2022-03-02 PROCEDURE — 71045 X-RAY EXAM CHEST 1 VIEW: CPT

## 2022-03-02 PROCEDURE — 74177 CT ABD & PELVIS W/CONTRAST: CPT

## 2022-03-02 PROCEDURE — 81003 URINALYSIS AUTO W/O SCOPE: CPT | Performed by: NURSE PRACTITIONER

## 2022-03-02 PROCEDURE — 25010000002 MORPHINE PER 10 MG: Performed by: NURSE PRACTITIONER

## 2022-03-02 PROCEDURE — 25010000002 KETOROLAC TROMETHAMINE PER 15 MG: Performed by: NURSE PRACTITIONER

## 2022-03-02 RX ORDER — PREDNISONE 20 MG/1
20 TABLET ORAL DAILY
COMMUNITY

## 2022-03-02 RX ORDER — CYCLOBENZAPRINE HCL 10 MG
10 TABLET ORAL 2 TIMES DAILY PRN
Qty: 15 TABLET | Refills: 0 | Status: SHIPPED | OUTPATIENT
Start: 2022-03-02

## 2022-03-02 RX ORDER — MORPHINE SULFATE 4 MG/ML
4 INJECTION, SOLUTION INTRAMUSCULAR; INTRAVENOUS ONCE
Status: COMPLETED | OUTPATIENT
Start: 2022-03-02 | End: 2022-03-02

## 2022-03-02 RX ORDER — METHOCARBAMOL 750 MG/1
750 TABLET, FILM COATED ORAL 4 TIMES DAILY PRN
COMMUNITY
End: 2022-03-02

## 2022-03-02 RX ORDER — KETOROLAC TROMETHAMINE 30 MG/ML
30 INJECTION, SOLUTION INTRAMUSCULAR; INTRAVENOUS EVERY 6 HOURS PRN
Status: DISCONTINUED | OUTPATIENT
Start: 2022-03-02 | End: 2022-03-02 | Stop reason: HOSPADM

## 2022-03-02 RX ORDER — METHYLPREDNISOLONE SODIUM SUCCINATE 125 MG/2ML
125 INJECTION, POWDER, LYOPHILIZED, FOR SOLUTION INTRAMUSCULAR; INTRAVENOUS ONCE
Status: COMPLETED | OUTPATIENT
Start: 2022-03-02 | End: 2022-03-02

## 2022-03-02 RX ADMIN — METHYLPREDNISOLONE SODIUM SUCCINATE 125 MG: 125 INJECTION, POWDER, FOR SOLUTION INTRAMUSCULAR; INTRAVENOUS at 12:17

## 2022-03-02 RX ADMIN — MORPHINE SULFATE 4 MG: 4 INJECTION, SOLUTION INTRAMUSCULAR; INTRAVENOUS at 09:04

## 2022-03-02 RX ADMIN — IOPAMIDOL 100 ML: 612 INJECTION, SOLUTION INTRAVENOUS at 09:22

## 2022-03-02 RX ADMIN — SODIUM CHLORIDE 1000 ML: 9 INJECTION, SOLUTION INTRAVENOUS at 08:39

## 2022-03-02 RX ADMIN — KETOROLAC TROMETHAMINE 30 MG: 30 INJECTION, SOLUTION INTRAMUSCULAR; INTRAVENOUS at 08:24

## 2022-03-02 NOTE — ED PROVIDER NOTES
Subjective   Chief Complaint: Right lower quadrant abdominal pain    History of Present Illness: This is a 66-year-old female patient comes into the ED accompanied by her daughter with complaints of lower abdominal pain that started on Saturday as back pain is progressively worsened. She rates her pain is 9 out of 10 causes a sharp stabbing pain that is intermittent. Patient was seen in urgent treatment on Monday was given steroids and a muscle relaxer and states patient states that these medications have not helped.    Nurses Notes reviewed and agree, including vitals, allergies, social history and prior medical history.                Review of Systems   Gastrointestinal: Positive for abdominal pain.   Genitourinary: Positive for dysuria.   Musculoskeletal: Positive for back pain.   All other systems reviewed and are negative.      Past Medical History:   Diagnosis Date   • Cancer (HCC)     skin       No Known Allergies    Past Surgical History:   Procedure Laterality Date   • APPENDECTOMY     • SKIN BIOPSY     • SKIN CANCER EXCISION         History reviewed. No pertinent family history.    Social History     Socioeconomic History   • Marital status:    Tobacco Use   • Smoking status: Current Every Day Smoker     Types: Cigarettes   Substance and Sexual Activity   • Alcohol use: No   • Drug use: No   • Sexual activity: Defer           Objective   Physical Exam  Vitals and nursing note reviewed.   Constitutional:       Appearance: Normal appearance.   HENT:      Head: Normocephalic and atraumatic.   Eyes:      Extraocular Movements: Extraocular movements intact.      Pupils: Pupils are equal, round, and reactive to light.   Cardiovascular:      Rate and Rhythm: Normal rate and regular rhythm.      Pulses: Normal pulses.      Heart sounds: Normal heart sounds.   Pulmonary:      Effort: Pulmonary effort is normal.      Breath sounds: Normal breath sounds.   Abdominal:      General: Abdomen is flat. Bowel  sounds are normal.      Palpations: Abdomen is soft.      Tenderness: There is abdominal tenderness in the right lower quadrant.      Comments: Moderate tenderness to palpation right lower quadrant   Musculoskeletal:      Cervical back: Normal range of motion and neck supple.   Skin:     General: Skin is warm and dry.      Capillary Refill: Capillary refill takes less than 2 seconds.   Neurological:      General: No focal deficit present.      Mental Status: She is alert and oriented to person, place, and time. Mental status is at baseline.      GCS: GCS eye subscore is 4. GCS verbal subscore is 5. GCS motor subscore is 6.      Sensory: Sensation is intact.      Motor: Motor function is intact.      Gait: Gait is intact.   Psychiatric:         Attention and Perception: Attention and perception normal.         Mood and Affect: Mood and affect normal.         Speech: Speech normal.         Behavior: Behavior normal. Behavior is cooperative.         Procedures           ED Course                                                 MDM    Final diagnoses:   Acute right-sided low back pain with right-sided sciatica       ED Disposition  ED Disposition     ED Disposition Condition Comment    Discharge Stable           Provider, No Known  [place] [id number] [phone]    In 1 week  Follow-up         Medication List      New Prescriptions    cyclobenzaprine 10 MG tablet  Commonly known as: FLEXERIL  Take 1 tablet by mouth 2 (Two) Times a Day As Needed for Muscle Spasms for up to 15 doses.        Stop    methocarbamol 750 MG tablet  Commonly known as: ROBAXIN           Where to Get Your Medications      These medications were sent to [id number] - [place], [place] - [address] - [phone]  - [phone] FX  [address] [place] [address]    Phone: [phone]   · cyclobenzaprine 10 MG tablet          Tommie Stevenson APRN  03/02/22 [id number]

## 2022-03-02 NOTE — ED NOTES
Ms. Cruz was unable to give urine sample at this time.                       Jaime Shaikh, PCT  03/02/22 0829